# Patient Record
Sex: FEMALE | Race: WHITE
[De-identification: names, ages, dates, MRNs, and addresses within clinical notes are randomized per-mention and may not be internally consistent; named-entity substitution may affect disease eponyms.]

---

## 2017-02-01 ENCOUNTER — TRANSCRIPTION ENCOUNTER (OUTPATIENT)
Age: 47
End: 2017-02-01

## 2017-09-06 PROBLEM — M54.5 LUMBAGO: Status: ACTIVE | Noted: 2017-09-06

## 2017-09-07 ENCOUNTER — APPOINTMENT (OUTPATIENT)
Dept: ORTHOPEDIC SURGERY | Facility: CLINIC | Age: 47
End: 2017-09-07
Payer: COMMERCIAL

## 2017-09-07 VITALS
DIASTOLIC BLOOD PRESSURE: 88 MMHG | HEIGHT: 65 IN | WEIGHT: 167 LBS | HEART RATE: 71 BPM | BODY MASS INDEX: 27.82 KG/M2 | SYSTOLIC BLOOD PRESSURE: 134 MMHG

## 2017-09-07 DIAGNOSIS — Z78.9 OTHER SPECIFIED HEALTH STATUS: ICD-10-CM

## 2017-09-07 DIAGNOSIS — M54.16 RADICULOPATHY, LUMBAR REGION: ICD-10-CM

## 2017-09-07 DIAGNOSIS — M54.5 LOW BACK PAIN: ICD-10-CM

## 2017-09-07 DIAGNOSIS — Z86.39 PERSONAL HISTORY OF OTHER ENDOCRINE, NUTRITIONAL AND METABOLIC DISEASE: ICD-10-CM

## 2017-09-07 PROCEDURE — 99204 OFFICE O/P NEW MOD 45 MIN: CPT

## 2017-09-07 PROCEDURE — 72100 X-RAY EXAM L-S SPINE 2/3 VWS: CPT

## 2017-09-07 RX ORDER — MELOXICAM 15 MG/1
15 TABLET ORAL
Qty: 30 | Refills: 0 | Status: ACTIVE | COMMUNITY
Start: 2017-09-07 | End: 1900-01-01

## 2017-09-07 RX ORDER — LEVOTHYROXINE SODIUM 137 UG/1
TABLET ORAL
Refills: 0 | Status: ACTIVE | COMMUNITY

## 2017-09-07 RX ORDER — PNV NO.95/FERROUS FUM/FOLIC AC 28MG-0.8MG
TABLET ORAL
Refills: 0 | Status: ACTIVE | COMMUNITY

## 2017-09-09 ENCOUNTER — EMERGENCY (EMERGENCY)
Facility: HOSPITAL | Age: 47
LOS: 1 days | Discharge: HOME | End: 2017-09-09
Attending: EMERGENCY MEDICINE | Admitting: EMERGENCY MEDICINE
Payer: COMMERCIAL

## 2017-09-09 VITALS
RESPIRATION RATE: 20 BRPM | SYSTOLIC BLOOD PRESSURE: 119 MMHG | WEIGHT: 164.91 LBS | HEIGHT: 65 IN | HEART RATE: 78 BPM | OXYGEN SATURATION: 98 % | TEMPERATURE: 98 F | DIASTOLIC BLOOD PRESSURE: 82 MMHG

## 2017-09-09 LAB
ALBUMIN SERPL ELPH-MCNC: 4.7 G/DL — SIGNIFICANT CHANGE UP (ref 3.3–5.2)
ALP SERPL-CCNC: 58 U/L — SIGNIFICANT CHANGE UP (ref 40–120)
ALT FLD-CCNC: 13 U/L — SIGNIFICANT CHANGE UP
ANION GAP SERPL CALC-SCNC: 14 MMOL/L — SIGNIFICANT CHANGE UP (ref 5–17)
APPEARANCE UR: CLEAR — SIGNIFICANT CHANGE UP
AST SERPL-CCNC: 22 U/L — SIGNIFICANT CHANGE UP
BILIRUB SERPL-MCNC: 0.4 MG/DL — SIGNIFICANT CHANGE UP (ref 0.4–2)
BILIRUB UR-MCNC: NEGATIVE — SIGNIFICANT CHANGE UP
BUN SERPL-MCNC: 13 MG/DL — SIGNIFICANT CHANGE UP (ref 8–20)
CALCIUM SERPL-MCNC: 9.4 MG/DL — SIGNIFICANT CHANGE UP (ref 8.6–10.2)
CHLORIDE SERPL-SCNC: 100 MMOL/L — SIGNIFICANT CHANGE UP (ref 98–107)
CO2 SERPL-SCNC: 24 MMOL/L — SIGNIFICANT CHANGE UP (ref 22–29)
COLOR SPEC: YELLOW — SIGNIFICANT CHANGE UP
CREAT SERPL-MCNC: 0.69 MG/DL — SIGNIFICANT CHANGE UP (ref 0.5–1.3)
DIFF PNL FLD: NEGATIVE — SIGNIFICANT CHANGE UP
GLUCOSE SERPL-MCNC: 108 MG/DL — SIGNIFICANT CHANGE UP (ref 70–115)
GLUCOSE UR QL: NEGATIVE MG/DL — SIGNIFICANT CHANGE UP
HCG UR QL: NEGATIVE — SIGNIFICANT CHANGE UP
HCT VFR BLD CALC: 34.5 % — LOW (ref 37–47)
HGB BLD-MCNC: 11.2 G/DL — LOW (ref 12–16)
KETONES UR-MCNC: NEGATIVE — SIGNIFICANT CHANGE UP
LEUKOCYTE ESTERASE UR-ACNC: NEGATIVE — SIGNIFICANT CHANGE UP
LIDOCAIN IGE QN: 31 U/L — SIGNIFICANT CHANGE UP (ref 22–51)
MCHC RBC-ENTMCNC: 28.4 PG — SIGNIFICANT CHANGE UP (ref 27–31)
MCHC RBC-ENTMCNC: 32.5 G/DL — SIGNIFICANT CHANGE UP (ref 32–36)
MCV RBC AUTO: 87.6 FL — SIGNIFICANT CHANGE UP (ref 81–99)
NITRITE UR-MCNC: NEGATIVE — SIGNIFICANT CHANGE UP
PH UR: 5 — SIGNIFICANT CHANGE UP (ref 5–8)
PLATELET # BLD AUTO: 261 K/UL — SIGNIFICANT CHANGE UP (ref 150–400)
POTASSIUM SERPL-MCNC: 4.4 MMOL/L — SIGNIFICANT CHANGE UP (ref 3.5–5.3)
POTASSIUM SERPL-SCNC: 4.4 MMOL/L — SIGNIFICANT CHANGE UP (ref 3.5–5.3)
PROT SERPL-MCNC: 8.3 G/DL — SIGNIFICANT CHANGE UP (ref 6.6–8.7)
PROT UR-MCNC: NEGATIVE MG/DL — SIGNIFICANT CHANGE UP
RBC # BLD: 3.94 M/UL — LOW (ref 4.4–5.2)
RBC # FLD: 14.4 % — SIGNIFICANT CHANGE UP (ref 11–15.6)
RBC CASTS # UR COMP ASSIST: NEGATIVE /HPF — SIGNIFICANT CHANGE UP (ref 0–4)
SODIUM SERPL-SCNC: 138 MMOL/L — SIGNIFICANT CHANGE UP (ref 135–145)
SP GR SPEC: 1.01 — SIGNIFICANT CHANGE UP (ref 1.01–1.02)
T4 AB SER-ACNC: 8.5 UG/DL — SIGNIFICANT CHANGE UP (ref 4.5–12)
TSH SERPL-MCNC: 0.58 UIU/ML — SIGNIFICANT CHANGE UP (ref 0.27–4.2)
UROBILINOGEN FLD QL: NEGATIVE MG/DL — SIGNIFICANT CHANGE UP
WBC # BLD: 10.3 K/UL — SIGNIFICANT CHANGE UP (ref 4.8–10.8)
WBC # FLD AUTO: 10.3 K/UL — SIGNIFICANT CHANGE UP (ref 4.8–10.8)
WBC UR QL: SIGNIFICANT CHANGE UP

## 2017-09-09 PROCEDURE — 84443 ASSAY THYROID STIM HORMONE: CPT

## 2017-09-09 PROCEDURE — 74020: CPT | Mod: 26

## 2017-09-09 PROCEDURE — 74020: CPT

## 2017-09-09 PROCEDURE — 80053 COMPREHEN METABOLIC PANEL: CPT

## 2017-09-09 PROCEDURE — 99284 EMERGENCY DEPT VISIT MOD MDM: CPT | Mod: 25

## 2017-09-09 PROCEDURE — 99284 EMERGENCY DEPT VISIT MOD MDM: CPT

## 2017-09-09 PROCEDURE — 83690 ASSAY OF LIPASE: CPT

## 2017-09-09 PROCEDURE — 96374 THER/PROPH/DIAG INJ IV PUSH: CPT

## 2017-09-09 PROCEDURE — 36415 COLL VENOUS BLD VENIPUNCTURE: CPT

## 2017-09-09 PROCEDURE — 96375 TX/PRO/DX INJ NEW DRUG ADDON: CPT

## 2017-09-09 PROCEDURE — 85027 COMPLETE CBC AUTOMATED: CPT

## 2017-09-09 PROCEDURE — 84436 ASSAY OF TOTAL THYROXINE: CPT

## 2017-09-09 PROCEDURE — 81001 URINALYSIS AUTO W/SCOPE: CPT

## 2017-09-09 PROCEDURE — 81025 URINE PREGNANCY TEST: CPT

## 2017-09-09 RX ORDER — SODIUM CHLORIDE 9 MG/ML
1000 INJECTION INTRAMUSCULAR; INTRAVENOUS; SUBCUTANEOUS
Qty: 0 | Refills: 0 | Status: DISCONTINUED | OUTPATIENT
Start: 2017-09-09 | End: 2017-09-13

## 2017-09-09 RX ORDER — MAGNESIUM HYDROXIDE 400 MG/1
30 TABLET, CHEWABLE ORAL ONCE
Qty: 0 | Refills: 0 | Status: DISCONTINUED | OUTPATIENT
Start: 2017-09-09 | End: 2017-09-13

## 2017-09-09 RX ORDER — METOCLOPRAMIDE HCL 10 MG
10 TABLET ORAL ONCE
Qty: 0 | Refills: 0 | Status: COMPLETED | OUTPATIENT
Start: 2017-09-09 | End: 2017-09-09

## 2017-09-09 RX ORDER — KETOROLAC TROMETHAMINE 30 MG/ML
30 SYRINGE (ML) INJECTION ONCE
Qty: 0 | Refills: 0 | Status: DISCONTINUED | OUTPATIENT
Start: 2017-09-09 | End: 2017-09-09

## 2017-09-09 RX ADMIN — Medication 30 MILLIGRAM(S): at 14:16

## 2017-09-09 RX ADMIN — Medication 10 MILLIGRAM(S): at 14:17

## 2017-09-09 RX ADMIN — SODIUM CHLORIDE 150 MILLILITER(S): 9 INJECTION INTRAMUSCULAR; INTRAVENOUS; SUBCUTANEOUS at 14:18

## 2017-09-09 NOTE — ED STATDOCS - ATTENDING CONTRIBUTION TO CARE
I, Binh Lima, performed the initial face to face bedside interview with this patient regarding history of present illness, review of symptoms and relevant past medical, social and family history.  I completed an independent physical examination.  I was the initial provider who evaluated this patient. I have signed out the follow up of any pending tests (i.e. labs, radiological studies) to the ACP.  I have communicated the patient’s plan of care and disposition with the ACP.

## 2017-09-09 NOTE — ED STATDOCS - MEDICAL DECISION MAKING DETAILS
Acute onset of abdominal pain, sharp, continuous, no relief with Simethicone, similar episodes in past, evaluated by GI recommended colonoscopy, notes hx of constipation in past, line, labs, with lipase for pancreatitis, TFT for hypothyroidism, UA for pregnancy/UTI, and AXR for increase stool/ air fluid levels, treat symptomatically, reassess, consider additional studies pending reassessment.

## 2017-09-09 NOTE — ED STATDOCS - OBJECTIVE STATEMENT
48 y/o female with PMHx hypothyroidism and PSHx  x3 presents to the ED with c/o sudden, sharp abdominal pain that waxes and wanes, onset 1 hour ago. Pt states she has had similar symptoms in the past, saw GI, has not had colonoscopy. Attempted to alleviate pain in the past with Simethicone. Pt reports constipation when working 3 days straight, but denies dysuria, fever, chills, and any other acute symptoms and complaints at this time. Pt currently on Synthroid.   LMP: 20 days ago

## 2017-09-09 NOTE — ED STATDOCS - PROGRESS NOTE DETAILS
patient re-evaluated c/o constipation x 3 days, PE: abd soft NT, ND, no masses or hernias, labs show anemia, and UA reviewed, AXR reviewed with radiologist Edson, no acute findings, +constipation noted, will RX MOM and enema to f/u with PMD

## 2017-10-18 ENCOUNTER — APPOINTMENT (OUTPATIENT)
Dept: ORTHOPEDIC SURGERY | Facility: CLINIC | Age: 47
End: 2017-10-18

## 2017-12-28 ENCOUNTER — OTHER (OUTPATIENT)
Age: 47
End: 2017-12-28

## 2018-08-16 PROBLEM — E03.9 HYPOTHYROIDISM, UNSPECIFIED: Chronic | Status: ACTIVE | Noted: 2017-09-11

## 2018-09-18 ENCOUNTER — APPOINTMENT (OUTPATIENT)
Dept: ORTHOPEDIC SURGERY | Facility: CLINIC | Age: 48
End: 2018-09-18
Payer: COMMERCIAL

## 2018-09-18 VITALS
BODY MASS INDEX: 27.82 KG/M2 | DIASTOLIC BLOOD PRESSURE: 84 MMHG | SYSTOLIC BLOOD PRESSURE: 126 MMHG | TEMPERATURE: 97.5 F | HEIGHT: 65 IN | HEART RATE: 74 BPM | WEIGHT: 167 LBS

## 2018-09-18 DIAGNOSIS — R22.31 LOCALIZED SWELLING, MASS AND LUMP, RIGHT UPPER LIMB: ICD-10-CM

## 2018-09-18 PROCEDURE — 73130 X-RAY EXAM OF HAND: CPT | Mod: RT

## 2018-09-18 PROCEDURE — 99213 OFFICE O/P EST LOW 20 MIN: CPT

## 2019-02-10 ENCOUNTER — RESULT REVIEW (OUTPATIENT)
Age: 49
End: 2019-02-10

## 2019-02-11 ENCOUNTER — APPOINTMENT (OUTPATIENT)
Dept: OBGYN | Facility: CLINIC | Age: 49
End: 2019-02-11
Payer: COMMERCIAL

## 2019-02-11 ENCOUNTER — TRANSCRIPTION ENCOUNTER (OUTPATIENT)
Age: 49
End: 2019-02-11

## 2019-02-11 PROCEDURE — 99386 PREV VISIT NEW AGE 40-64: CPT

## 2019-03-06 ENCOUNTER — APPOINTMENT (OUTPATIENT)
Dept: MAMMOGRAPHY | Facility: CLINIC | Age: 49
End: 2019-03-06
Payer: COMMERCIAL

## 2019-03-06 ENCOUNTER — APPOINTMENT (OUTPATIENT)
Dept: ULTRASOUND IMAGING | Facility: CLINIC | Age: 49
End: 2019-03-06
Payer: COMMERCIAL

## 2019-03-06 ENCOUNTER — OUTPATIENT (OUTPATIENT)
Dept: OUTPATIENT SERVICES | Facility: HOSPITAL | Age: 49
LOS: 1 days | End: 2019-03-06
Payer: COMMERCIAL

## 2019-03-06 DIAGNOSIS — R92.8 OTHER ABNORMAL AND INCONCLUSIVE FINDINGS ON DIAGNOSTIC IMAGING OF BREAST: ICD-10-CM

## 2019-03-06 PROCEDURE — 77067 SCR MAMMO BI INCL CAD: CPT | Mod: 26

## 2019-03-06 PROCEDURE — 77063 BREAST TOMOSYNTHESIS BI: CPT

## 2019-03-06 PROCEDURE — 77067 SCR MAMMO BI INCL CAD: CPT

## 2019-03-06 PROCEDURE — 76641 ULTRASOUND BREAST COMPLETE: CPT | Mod: 26,50

## 2019-03-06 PROCEDURE — 76641 ULTRASOUND BREAST COMPLETE: CPT

## 2019-03-06 PROCEDURE — 77063 BREAST TOMOSYNTHESIS BI: CPT | Mod: 26

## 2019-10-15 ENCOUNTER — APPOINTMENT (OUTPATIENT)
Dept: ULTRASOUND IMAGING | Facility: CLINIC | Age: 49
End: 2019-10-15
Payer: COMMERCIAL

## 2019-10-15 ENCOUNTER — OUTPATIENT (OUTPATIENT)
Dept: OUTPATIENT SERVICES | Facility: HOSPITAL | Age: 49
LOS: 1 days | End: 2019-10-15
Payer: COMMERCIAL

## 2019-10-15 DIAGNOSIS — N64.89 OTHER SPECIFIED DISORDERS OF BREAST: ICD-10-CM

## 2019-10-15 PROCEDURE — 76642 ULTRASOUND BREAST LIMITED: CPT | Mod: 26,LT

## 2019-10-15 PROCEDURE — 76642 ULTRASOUND BREAST LIMITED: CPT

## 2020-03-29 ENCOUNTER — APPOINTMENT (OUTPATIENT)
Dept: DISASTER EMERGENCY | Facility: CLINIC | Age: 50
End: 2020-03-29

## 2020-03-29 DIAGNOSIS — R68.89 OTHER GENERAL SYMPTOMS AND SIGNS: ICD-10-CM

## 2020-03-29 DIAGNOSIS — Z20.828 CONTACT WITH AND (SUSPECTED) EXPOSURE TO OTHER VIRAL COMMUNICABLE DISEASES: ICD-10-CM

## 2020-04-02 PROBLEM — R68.89 SUSPECTED 2019 NOVEL CORONAVIRUS INFECTION: Status: ACTIVE | Noted: 2020-03-29

## 2020-04-02 PROBLEM — Z20.828 EXPOSURE TO 2019 NOVEL CORONAVIRUS: Status: ACTIVE | Noted: 2020-03-29

## 2020-04-06 LAB — SARS-COV-2 N GENE NPH QL NAA+PROBE: DETECTED

## 2020-04-25 ENCOUNTER — MESSAGE (OUTPATIENT)
Age: 50
End: 2020-04-25

## 2020-05-01 ENCOUNTER — APPOINTMENT (OUTPATIENT)
Dept: DISASTER EMERGENCY | Facility: HOSPITAL | Age: 50
End: 2020-05-01

## 2020-05-02 LAB
SARS-COV-2 IGG SERPL IA-ACNC: 7 INDEX
SARS-COV-2 IGG SERPL QL IA: POSITIVE

## 2020-05-06 ENCOUNTER — APPOINTMENT (OUTPATIENT)
Dept: VASCULAR SURGERY | Facility: CLINIC | Age: 50
End: 2020-05-06
Payer: COMMERCIAL

## 2020-05-06 PROCEDURE — 93970 EXTREMITY STUDY: CPT

## 2020-05-06 PROCEDURE — 99203 OFFICE O/P NEW LOW 30 MIN: CPT

## 2020-05-06 RX ORDER — ALBUTEROL SULFATE 90 UG/1
108 (90 BASE) INHALANT RESPIRATORY (INHALATION)
Qty: 6 | Refills: 0 | Status: ACTIVE | COMMUNITY
Start: 2020-01-11

## 2020-07-06 ENCOUNTER — TRANSCRIPTION ENCOUNTER (OUTPATIENT)
Age: 50
End: 2020-07-06

## 2020-07-21 NOTE — PROCEDURE
[FreeTextEntry1] : Venous duplex without DVT or SVT. No deep reflux. Has severe reflux on left anterior saphenous vein and tributary varicose veins bilaterally

## 2020-07-21 NOTE — ASSESSMENT
[Arterial/Venous Disease] : arterial/venous disease [FreeTextEntry1] : 51 y/o woman with severe venous stasis and insufficiency, interfering with ability to work and quality of life. Will need superficial reflux treated to prevent ulceration. She understands and agrees. Patient has a component of primary lymphedema. She has been compliant with conservative therapies including compression and elevation for the past month. Despite these therapies symptoms continue to progress. Early signs of hyperpigmentation noted in both calves. I am now recommending a lymphedema pump vended by 10sec. She will require ablation of left ASV and possible sclerotherapy bilaterally to improve symptoms. Will continue compression until procedure.\par \par -Compression/elevation\par Ambulation/exercise\par -Pain control\par -Lymphedema pump\par -RTC for left leg RFA\par

## 2020-07-21 NOTE — PHYSICAL EXAM
[JVD] : no jugular venous distention  [Carotid Bruits] : no carotid bruits [Normal Heart Sounds] : normal heart sounds [Normal Breath Sounds] : Normal breath sounds [Left Carotid Bruit] : no bruit heard over the left carotid [Right Carotid Bruit] : no bruit heard over the right carotid [Left Femoral Bruit] : no bruit heard over the left femoral artery [Right Femoral Bruit] : no bruit heard over the right femoral artery [Ankle Swelling (On Exam)] : present [2+] : right 2+ [Ankle Swelling On The Left] : moderate [Varicose Veins Of Lower Extremities] : bilaterally [No Rash or Lesion] : No rash or lesion [] : bilaterally [Ankle Swelling Bilaterally] : severe [Oriented to Person] : oriented to person [Alert] : alert [Calm] : calm [Oriented to Place] : oriented to place [Oriented to Time] : oriented to time [de-identified] : awake alert [de-identified] : MOM, PERRLA [de-identified] : bilateral edema, hyperpigmentation and bulging veins

## 2020-07-21 NOTE — HISTORY OF PRESENT ILLNESS
[FreeTextEntry1] : 51 y/o woman with severe pain on her lower extremities related to varicose veins. Has had vein stripping in the past and uses panty-hose support stockings custom, and still has worsening symptoms. 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 Has discoloration but no ulcr-eration or cellulitis at this time. Exercises regularly.

## 2020-08-27 ENCOUNTER — RESULT REVIEW (OUTPATIENT)
Age: 50
End: 2020-08-27

## 2020-08-27 ENCOUNTER — APPOINTMENT (OUTPATIENT)
Dept: OBGYN | Facility: CLINIC | Age: 50
End: 2020-08-27
Payer: COMMERCIAL

## 2020-08-27 PROCEDURE — 99396 PREV VISIT EST AGE 40-64: CPT

## 2020-08-27 PROCEDURE — 36415 COLL VENOUS BLD VENIPUNCTURE: CPT

## 2020-09-14 ENCOUNTER — FORM ENCOUNTER (OUTPATIENT)
Age: 50
End: 2020-09-14

## 2020-09-16 ENCOUNTER — APPOINTMENT (OUTPATIENT)
Dept: VASCULAR SURGERY | Facility: CLINIC | Age: 50
End: 2020-09-16

## 2020-09-28 ENCOUNTER — APPOINTMENT (OUTPATIENT)
Dept: VASCULAR SURGERY | Facility: CLINIC | Age: 50
End: 2020-09-28

## 2020-10-02 ENCOUNTER — APPOINTMENT (OUTPATIENT)
Dept: MAMMOGRAPHY | Facility: CLINIC | Age: 50
End: 2020-10-02
Payer: COMMERCIAL

## 2020-10-02 ENCOUNTER — RESULT REVIEW (OUTPATIENT)
Age: 50
End: 2020-10-02

## 2020-10-02 ENCOUNTER — OUTPATIENT (OUTPATIENT)
Dept: OUTPATIENT SERVICES | Facility: HOSPITAL | Age: 50
LOS: 1 days | End: 2020-10-02
Payer: COMMERCIAL

## 2020-10-02 ENCOUNTER — APPOINTMENT (OUTPATIENT)
Dept: ULTRASOUND IMAGING | Facility: CLINIC | Age: 50
End: 2020-10-02
Payer: COMMERCIAL

## 2020-10-02 PROCEDURE — 77067 SCR MAMMO BI INCL CAD: CPT | Mod: 26

## 2020-10-02 PROCEDURE — 76641 ULTRASOUND BREAST COMPLETE: CPT | Mod: 26,50

## 2020-10-02 PROCEDURE — 77063 BREAST TOMOSYNTHESIS BI: CPT | Mod: 26

## 2020-10-02 PROCEDURE — 77067 SCR MAMMO BI INCL CAD: CPT

## 2020-10-02 PROCEDURE — 76641 ULTRASOUND BREAST COMPLETE: CPT

## 2020-10-02 PROCEDURE — 77063 BREAST TOMOSYNTHESIS BI: CPT

## 2021-03-03 ENCOUNTER — APPOINTMENT (OUTPATIENT)
Dept: VASCULAR SURGERY | Facility: CLINIC | Age: 51
End: 2021-03-03
Payer: COMMERCIAL

## 2021-03-03 VITALS
HEART RATE: 75 BPM | BODY MASS INDEX: 27.99 KG/M2 | WEIGHT: 168 LBS | HEIGHT: 65 IN | SYSTOLIC BLOOD PRESSURE: 116 MMHG | OXYGEN SATURATION: 97 % | DIASTOLIC BLOOD PRESSURE: 75 MMHG

## 2021-03-03 PROCEDURE — 99072 ADDL SUPL MATRL&STAF TM PHE: CPT

## 2021-03-03 PROCEDURE — 99213 OFFICE O/P EST LOW 20 MIN: CPT

## 2021-03-03 PROCEDURE — 93970 EXTREMITY STUDY: CPT

## 2021-03-03 RX ORDER — AZITHROMYCIN 250 MG/1
250 TABLET, FILM COATED ORAL
Qty: 6 | Refills: 0 | Status: COMPLETED | COMMUNITY
Start: 2020-01-11 | End: 2021-03-03

## 2021-03-03 RX ORDER — METHYLPREDNISOLONE 4 MG/1
4 TABLET ORAL
Qty: 1 | Refills: 0 | Status: COMPLETED | COMMUNITY
Start: 2017-09-07 | End: 2021-03-03

## 2021-04-23 RX ORDER — ALPRAZOLAM 0.25 MG/1
0.25 TABLET, ORALLY DISINTEGRATING ORAL ONCE
Qty: 1 | Refills: 0 | Status: ACTIVE | COMMUNITY
Start: 2021-04-23 | End: 1900-01-01

## 2021-07-23 ENCOUNTER — APPOINTMENT (OUTPATIENT)
Dept: VASCULAR SURGERY | Facility: CLINIC | Age: 51
End: 2021-07-23
Payer: COMMERCIAL

## 2021-07-23 PROCEDURE — 99072 ADDL SUPL MATRL&STAF TM PHE: CPT

## 2021-07-23 PROCEDURE — 36475 ENDOVENOUS RF 1ST VEIN: CPT

## 2021-07-23 NOTE — PROCEDURE
[FreeTextEntry1] : left saphenous vein RFA [FreeTextEntry2] : venous stasis [FreeTextEntry3] : see attached note

## 2021-07-28 ENCOUNTER — APPOINTMENT (OUTPATIENT)
Dept: VASCULAR SURGERY | Facility: CLINIC | Age: 51
End: 2021-07-28
Payer: COMMERCIAL

## 2021-07-28 PROCEDURE — 93970 EXTREMITY STUDY: CPT

## 2021-08-06 ENCOUNTER — APPOINTMENT (OUTPATIENT)
Dept: VASCULAR SURGERY | Facility: CLINIC | Age: 51
End: 2021-08-06
Payer: COMMERCIAL

## 2021-08-06 VITALS
HEART RATE: 59 BPM | SYSTOLIC BLOOD PRESSURE: 120 MMHG | TEMPERATURE: 97 F | OXYGEN SATURATION: 100 % | DIASTOLIC BLOOD PRESSURE: 84 MMHG

## 2021-08-06 PROCEDURE — 99213 OFFICE O/P EST LOW 20 MIN: CPT

## 2021-09-24 ENCOUNTER — APPOINTMENT (OUTPATIENT)
Dept: VASCULAR SURGERY | Facility: CLINIC | Age: 51
End: 2021-09-24
Payer: COMMERCIAL

## 2021-09-24 VITALS
HEART RATE: 64 BPM | SYSTOLIC BLOOD PRESSURE: 122 MMHG | OXYGEN SATURATION: 100 % | TEMPERATURE: 98 F | DIASTOLIC BLOOD PRESSURE: 81 MMHG

## 2021-09-24 DIAGNOSIS — Z98.890 OTHER SPECIFIED POSTPROCEDURAL STATES: ICD-10-CM

## 2021-09-24 DIAGNOSIS — I87.2 VENOUS INSUFFICIENCY (CHRONIC) (PERIPHERAL): ICD-10-CM

## 2021-09-24 PROCEDURE — 99213 OFFICE O/P EST LOW 20 MIN: CPT

## 2021-10-14 ENCOUNTER — LABORATORY RESULT (OUTPATIENT)
Age: 51
End: 2021-10-14

## 2021-10-14 ENCOUNTER — APPOINTMENT (OUTPATIENT)
Dept: OBGYN | Facility: CLINIC | Age: 51
End: 2021-10-14
Payer: COMMERCIAL

## 2021-10-14 ENCOUNTER — RESULT REVIEW (OUTPATIENT)
Age: 51
End: 2021-10-14

## 2021-10-14 VITALS
SYSTOLIC BLOOD PRESSURE: 115 MMHG | DIASTOLIC BLOOD PRESSURE: 77 MMHG | WEIGHT: 175 LBS | BODY MASS INDEX: 29.16 KG/M2 | HEIGHT: 65 IN

## 2021-10-14 PROCEDURE — 36415 COLL VENOUS BLD VENIPUNCTURE: CPT

## 2021-10-14 PROCEDURE — 99396 PREV VISIT EST AGE 40-64: CPT

## 2021-10-18 DIAGNOSIS — N39.0 URINARY TRACT INFECTION, SITE NOT SPECIFIED: ICD-10-CM

## 2021-10-18 RX ORDER — SULFAMETHOXAZOLE AND TRIMETHOPRIM 800; 160 MG/1; MG/1
800-160 TABLET ORAL TWICE DAILY
Qty: 10 | Refills: 0 | Status: ACTIVE | COMMUNITY
Start: 2021-10-18 | End: 1900-01-01

## 2021-12-08 ENCOUNTER — APPOINTMENT (OUTPATIENT)
Dept: ULTRASOUND IMAGING | Facility: CLINIC | Age: 51
End: 2021-12-08
Payer: COMMERCIAL

## 2021-12-08 ENCOUNTER — RESULT REVIEW (OUTPATIENT)
Age: 51
End: 2021-12-08

## 2021-12-08 ENCOUNTER — OUTPATIENT (OUTPATIENT)
Dept: OUTPATIENT SERVICES | Facility: HOSPITAL | Age: 51
LOS: 1 days | End: 2021-12-08
Payer: COMMERCIAL

## 2021-12-08 DIAGNOSIS — Z00.8 ENCOUNTER FOR OTHER GENERAL EXAMINATION: ICD-10-CM

## 2021-12-08 PROCEDURE — 76830 TRANSVAGINAL US NON-OB: CPT | Mod: 26

## 2021-12-08 PROCEDURE — 76830 TRANSVAGINAL US NON-OB: CPT

## 2021-12-08 PROCEDURE — 76856 US EXAM PELVIC COMPLETE: CPT | Mod: 26

## 2021-12-08 PROCEDURE — 76856 US EXAM PELVIC COMPLETE: CPT

## 2021-12-17 ENCOUNTER — OUTPATIENT (OUTPATIENT)
Dept: OUTPATIENT SERVICES | Facility: HOSPITAL | Age: 51
LOS: 1 days | End: 2021-12-17
Payer: COMMERCIAL

## 2021-12-17 ENCOUNTER — RESULT REVIEW (OUTPATIENT)
Age: 51
End: 2021-12-17

## 2021-12-17 ENCOUNTER — APPOINTMENT (OUTPATIENT)
Dept: ULTRASOUND IMAGING | Facility: CLINIC | Age: 51
End: 2021-12-17
Payer: COMMERCIAL

## 2021-12-17 ENCOUNTER — APPOINTMENT (OUTPATIENT)
Dept: MAMMOGRAPHY | Facility: CLINIC | Age: 51
End: 2021-12-17
Payer: COMMERCIAL

## 2021-12-17 DIAGNOSIS — Z00.8 ENCOUNTER FOR OTHER GENERAL EXAMINATION: ICD-10-CM

## 2021-12-17 PROCEDURE — 77063 BREAST TOMOSYNTHESIS BI: CPT | Mod: 26

## 2021-12-17 PROCEDURE — 76641 ULTRASOUND BREAST COMPLETE: CPT | Mod: 26,50

## 2021-12-17 PROCEDURE — 77067 SCR MAMMO BI INCL CAD: CPT

## 2021-12-17 PROCEDURE — 77067 SCR MAMMO BI INCL CAD: CPT | Mod: 26

## 2021-12-17 PROCEDURE — 76641 ULTRASOUND BREAST COMPLETE: CPT

## 2021-12-17 PROCEDURE — 77063 BREAST TOMOSYNTHESIS BI: CPT

## 2022-01-13 ENCOUNTER — APPOINTMENT (OUTPATIENT)
Dept: OBGYN | Facility: CLINIC | Age: 52
End: 2022-01-13
Payer: COMMERCIAL

## 2022-01-13 VITALS
HEIGHT: 65 IN | BODY MASS INDEX: 28.32 KG/M2 | HEART RATE: 90 BPM | WEIGHT: 170 LBS | RESPIRATION RATE: 15 BRPM | DIASTOLIC BLOOD PRESSURE: 76 MMHG | OXYGEN SATURATION: 98 % | SYSTOLIC BLOOD PRESSURE: 116 MMHG

## 2022-01-13 DIAGNOSIS — Z92.89 PERSONAL HISTORY OF OTHER MEDICAL TREATMENT: ICD-10-CM

## 2022-01-13 DIAGNOSIS — N60.02 SOLITARY CYST OF LEFT BREAST: ICD-10-CM

## 2022-01-13 DIAGNOSIS — Z78.9 OTHER SPECIFIED HEALTH STATUS: ICD-10-CM

## 2022-01-13 DIAGNOSIS — Z98.890 OTHER SPECIFIED POSTPROCEDURAL STATES: ICD-10-CM

## 2022-01-13 DIAGNOSIS — Z82.49 FAMILY HISTORY OF ISCHEMIC HEART DISEASE AND OTHER DISEASES OF THE CIRCULATORY SYSTEM: ICD-10-CM

## 2022-01-13 DIAGNOSIS — E03.9 HYPOTHYROIDISM, UNSPECIFIED: ICD-10-CM

## 2022-01-13 DIAGNOSIS — N39.3 STRESS INCONTINENCE (FEMALE) (MALE): ICD-10-CM

## 2022-01-13 DIAGNOSIS — Z80.41 FAMILY HISTORY OF MALIGNANT NEOPLASM OF OVARY: ICD-10-CM

## 2022-01-13 PROCEDURE — 81025 URINE PREGNANCY TEST: CPT

## 2022-01-13 PROCEDURE — 58100 BIOPSY OF UTERUS LINING: CPT

## 2022-01-13 RX ORDER — OMEGA-3/DHA/EPA/FISH OIL 300-1000MG
CAPSULE ORAL
Refills: 0 | Status: ACTIVE | COMMUNITY

## 2022-01-13 RX ORDER — PNV NO.95/FERROUS FUM/FOLIC AC 28MG-0.8MG
TABLET ORAL
Refills: 0 | Status: ACTIVE | COMMUNITY

## 2022-01-13 NOTE — PROCEDURE
[Endometrial Biopsy] : Endometrial biopsy [Consent Obtained] : Consent obtained [Risks] : risks [Benefits] : benefits [Patient] : patient [Infection] : infection [Bleeding] : bleeding [Allergic Reaction] : allergic reaction [Uterine Perforation] : uterine perforation [Negative] : negative pregnancy test [No Premedication] : No premedication [None] : none [Easy Passage] : Easy passage [Sounded to ___ cm] : sounded to [unfilled] ~Ucm [Scant] : scant [Sent to Pathology] : placed in buffered formalin and sent for pathology [Tolerated Well] : Patient tolerated the procedure well [No Complications] : No complications [LMPDate] : 11/23/2021 [de-identified] : Serena

## 2022-01-28 LAB — CORE LAB BIOPSY: NORMAL

## 2022-02-02 ENCOUNTER — APPOINTMENT (OUTPATIENT)
Dept: OBGYN | Facility: CLINIC | Age: 52
End: 2022-02-02

## 2022-07-19 ENCOUNTER — NON-APPOINTMENT (OUTPATIENT)
Age: 52
End: 2022-07-19

## 2022-08-04 NOTE — ED STATDOCS - CONSTITUTIONAL [-], MLM
Subjective:       Patient ID: Starr Charles is a 17 y.o. female.    Chief Complaint: Cough, Sore Throat, and Nasal Congestion (Cough, sore throat , nasal congestion started today )    Cough, sore throat and nasal congestion    Review of Systems   Constitutional: Negative for appetite change, fatigue and fever.   HENT: Positive for nasal congestion and sore throat. Negative for ear pain.    Eyes: Negative for pain, discharge and itching.   Respiratory: Positive for cough. Negative for shortness of breath.    Cardiovascular: Negative for chest pain and leg swelling.   Gastrointestinal: Negative for abdominal pain, change in bowel habit, nausea, vomiting and change in bowel habit.   Musculoskeletal: Negative for back pain, gait problem and neck pain.   Integumentary:  Negative for rash and wound.   Allergic/Immunologic: Negative for immunocompromised state.   Neurological: Negative for dizziness, weakness and headaches.   All other systems reviewed and are negative.        Objective:      Physical Exam  Vitals and nursing note reviewed.   Constitutional:       General: She is not in acute distress.     Appearance: Normal appearance. She is not ill-appearing, toxic-appearing or diaphoretic.   HENT:      Head: Normocephalic.      Right Ear: Tympanic membrane, ear canal and external ear normal.      Left Ear: Tympanic membrane, ear canal and external ear normal.      Nose: Congestion present. No rhinorrhea.      Mouth/Throat:      Mouth: Mucous membranes are moist.      Pharynx: Posterior oropharyngeal erythema present. No oropharyngeal exudate.   Eyes:      General: No scleral icterus.        Right eye: No discharge.         Left eye: No discharge.      Extraocular Movements: Extraocular movements intact.      Conjunctiva/sclera: Conjunctivae normal.      Pupils: Pupils are equal, round, and reactive to light.   Cardiovascular:      Rate and Rhythm: Normal rate and regular rhythm.      Pulses: Normal pulses.       Heart sounds: Normal heart sounds. No murmur heard.  Pulmonary:      Effort: Pulmonary effort is normal. No respiratory distress.      Breath sounds: Normal breath sounds. No wheezing, rhonchi or rales.   Musculoskeletal:         General: Normal range of motion.      Cervical back: Neck supple. No tenderness.   Lymphadenopathy:      Cervical: No cervical adenopathy.   Skin:     General: Skin is warm and dry.      Capillary Refill: Capillary refill takes less than 2 seconds.      Findings: No rash.   Neurological:      Mental Status: She is alert and oriented to person, place, and time.   Psychiatric:         Mood and Affect: Mood normal.         Behavior: Behavior normal.         Thought Content: Thought content normal.         Judgment: Judgment normal.         Office Visit on 08/04/2022   Component Date Value Ref Range Status    SARS Coronavirus 2 Antigen 08/04/2022 Positive (A) Negative Final     Acceptable 08/04/2022 Yes   Final    Rapid Strep A Screen 08/04/2022 Negative  Negative Final     Acceptable 08/04/2022 Yes   Final      Assessment:       1. COVID-19    2. Encounter for screening laboratory testing for COVID-19 virus    3. Sore throat        Plan:   COVID-19    Encounter for screening laboratory testing for COVID-19 virus  -     SARS Coronavirus 2 Antigen, POCT    Sore throat  -     POCT rapid strep A              no fever/no chills

## 2022-10-21 ENCOUNTER — APPOINTMENT (OUTPATIENT)
Dept: VASCULAR SURGERY | Facility: CLINIC | Age: 52
End: 2022-10-21

## 2022-11-08 ENCOUNTER — APPOINTMENT (OUTPATIENT)
Dept: ENDOCRINOLOGY | Facility: CLINIC | Age: 52
End: 2022-11-08

## 2022-11-11 ENCOUNTER — APPOINTMENT (OUTPATIENT)
Dept: VASCULAR SURGERY | Facility: CLINIC | Age: 52
End: 2022-11-11

## 2022-11-11 VITALS
WEIGHT: 175 LBS | SYSTOLIC BLOOD PRESSURE: 116 MMHG | BODY MASS INDEX: 29.16 KG/M2 | OXYGEN SATURATION: 99 % | HEART RATE: 67 BPM | DIASTOLIC BLOOD PRESSURE: 78 MMHG | HEIGHT: 65 IN

## 2022-11-11 DIAGNOSIS — I83.893 VARICOSE VEINS OF BILATERAL LOWER EXTREMITIES WITH OTHER COMPLICATIONS: ICD-10-CM

## 2022-11-11 PROCEDURE — 36471 NJX SCLRSNT MLT INCMPTNT VN: CPT

## 2022-11-11 NOTE — PROCEDURE
[FreeTextEntry1] : BLE sclerotherapy  [FreeTextEntry2] : painful and itchy reticular and spider veins  [FreeTextEntry3] : Explained sclerotherapy procedure to patient and obtained verbal consent. All questions answered prior to procedure. 70% alcohol used to prep legs prior to procedure. 1% polidocanol injected into BLE reticular veins through a 30 gauge needle. Over 20 injections into legs bilaterally. Sterile gauze and ACE wraps applied to legs bilaterally. Pt tolerated procedure well.\par

## 2023-01-16 ENCOUNTER — NON-APPOINTMENT (OUTPATIENT)
Age: 53
End: 2023-01-16

## 2023-04-04 ENCOUNTER — APPOINTMENT (OUTPATIENT)
Dept: OBGYN | Facility: CLINIC | Age: 53
End: 2023-04-04
Payer: COMMERCIAL

## 2023-04-04 ENCOUNTER — NON-APPOINTMENT (OUTPATIENT)
Age: 53
End: 2023-04-04

## 2023-04-04 VITALS
SYSTOLIC BLOOD PRESSURE: 112 MMHG | DIASTOLIC BLOOD PRESSURE: 68 MMHG | BODY MASS INDEX: 29.66 KG/M2 | HEIGHT: 65 IN | WEIGHT: 178 LBS

## 2023-04-04 DIAGNOSIS — Z87.42 PERSONAL HISTORY OF OTHER DISEASES OF THE FEMALE GENITAL TRACT: ICD-10-CM

## 2023-04-04 DIAGNOSIS — Z87.440 PERSONAL HISTORY OF URINARY (TRACT) INFECTIONS: ICD-10-CM

## 2023-04-04 DIAGNOSIS — Z12.39 ENCOUNTER FOR OTHER SCREENING FOR MALIGNANT NEOPLASM OF BREAST: ICD-10-CM

## 2023-04-04 DIAGNOSIS — N95.1 MENOPAUSAL AND FEMALE CLIMACTERIC STATES: ICD-10-CM

## 2023-04-04 DIAGNOSIS — N89.8 OTHER SPECIFIED NONINFLAMMATORY DISORDERS OF VAGINA: ICD-10-CM

## 2023-04-04 DIAGNOSIS — R68.82 DECREASED LIBIDO: ICD-10-CM

## 2023-04-04 DIAGNOSIS — Z12.31 ENCOUNTER FOR SCREENING MAMMOGRAM FOR MALIGNANT NEOPLASM OF BREAST: ICD-10-CM

## 2023-04-04 DIAGNOSIS — R92.2 INCONCLUSIVE MAMMOGRAM: ICD-10-CM

## 2023-04-04 DIAGNOSIS — N95.2 POSTMENOPAUSAL ATROPHIC VAGINITIS: ICD-10-CM

## 2023-04-04 DIAGNOSIS — R50.9 FEVER, UNSPECIFIED: ICD-10-CM

## 2023-04-04 DIAGNOSIS — Z01.411 ENCOUNTER FOR GYNECOLOGICAL EXAMINATION (GENERAL) (ROUTINE) WITH ABNORMAL FINDINGS: ICD-10-CM

## 2023-04-04 DIAGNOSIS — R10.2 PELVIC AND PERINEAL PAIN: ICD-10-CM

## 2023-04-04 PROCEDURE — 99396 PREV VISIT EST AGE 40-64: CPT

## 2023-04-04 PROCEDURE — 99213 OFFICE O/P EST LOW 20 MIN: CPT | Mod: 25

## 2023-04-04 PROCEDURE — 36415 COLL VENOUS BLD VENIPUNCTURE: CPT

## 2023-04-04 NOTE — HISTORY OF PRESENT ILLNESS
[FreeTextEntry1] : 51yo  postmen fe presents for routine gyn exam.  Pt. reports she has been suffering from  weight gain/moodiness/hot flashes/vaginal dryness/low libido and pain in her joints.  Pt. states she is exercises and eats a healthy diet but continues to put on weight.  Pt. has hx of Hashimoto's.  \par \par \par <<<<<Last exam 2022 for endometrial Biopsy that was benign for PMB\par <<<<<exam 10/2021 AV with [facility] pap wnl\par \par Info. 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 Sexually Active \par PMH\par HYPOTHYROID\par Accepts blood products\par Surgical History: C/Sx3\par Allergies: Latex,\par Current Medications: Prescribed/Suppliments/OTC SYNTHROID 150mcg, MULTIVITAMIN, VITAMIN B12, MAGNESIUM,\par Medications Verified Medications Verified\par Last PAP: 2019 -- Pap normal, Hpv neg Last Mammo: 2014 - MAMMO-NEG Last Colonoscopy: 2019\par Family Hx\par OV Cancer: ? Ovarian age 86\par Personal history of blood clots/DVT/PE: no\par Personal history of conditions causing increased risk of blood clots/DVT/PE: no\par Family history of blood clots/DVT/PE: no\par Family history of conditions causing increased risk of blood clots/DVT/PE: no\par \par Social History\par Patient nonsmoker and has no familial exposure to second hand smoke\par Smoker Status: Never smoker\par \par \par  [Mammogramdate] : 12/2021 [TextBox_19] : wnl [BreastSonogramDate] : 12/2021 [TextBox_25] : wnl [PapSmeardate] : 10/2021 [TextBox_31] : wnl

## 2023-04-04 NOTE — PLAN
[FreeTextEntry1] : HCM\par -SBE\par -pap & HPV today\par -Rx mammo/sono given\par -MVI, Calcium, Vit d\par -Weight/exercise\par -reminded about colonoscopy\par -f/u PCP for recommended HCM, vaccinations and CA screening\par \par Menopausal symptoms\par Discussed the treatment options regarding postmenopausal symptoms focusing on holistic treatments, and non-medicinal treatment. Pt. denies unexplained vaginal bleeding, liver dysfunction/disease, Hx of DVT, pulmonary embolism/clotting disorders, thrombophilia, Stroke, TIA, untreated hypertension, personal Hx of breast/endometrial/estrogen-dependent cancers or heart disease.  \par -Pt. to try  behavior and lifestyle changes x 3 months as well as Bonified products as well as vaginal estrogen.\par -eRx estrace cream\par \par Joint pain\par -referred to Julieth Raymond\par \par Thyroid issues\par -Referred to Linsey Perera\par \par Low Libido\par -discussed strategies as well as FSC/oxytocin products.  Pt. to let me know if she decides to try. \par \par \par

## 2023-04-11 ENCOUNTER — TRANSCRIPTION ENCOUNTER (OUTPATIENT)
Age: 53
End: 2023-04-11

## 2023-04-11 ENCOUNTER — NON-APPOINTMENT (OUTPATIENT)
Age: 53
End: 2023-04-11

## 2023-04-14 LAB
17OHP SERPL-MCNC: 12 NG/DL
25(OH)D3 SERPL-MCNC: 37.7 NG/ML
CHOLEST SERPL-MCNC: 234 MG/DL
CYTOLOGY CVX/VAG DOC THIN PREP: ABNORMAL
DHEA SERPL-MCNC: 231 NG/DL
DHEA-S SERPL-MCNC: 114 UG/DL
ESTIMATED AVERAGE GLUCOSE: 120 MG/DL
ESTRADIOL SERPL-MCNC: 9 PG/ML
FSH SERPL-MCNC: 97.1 IU/L
HBA1C MFR BLD HPLC: 5.8 %
HDLC SERPL-MCNC: 70 MG/DL
HPV HIGH+LOW RISK DNA PNL CVX: NOT DETECTED
LDLC SERPL CALC-MCNC: 138 MG/DL
LH SERPL-ACNC: 58.5 IU/L
NONHDLC SERPL-MCNC: 163 MG/DL
PROLACTIN SERPL-MCNC: 11.1 NG/ML
TESTOST FREE SERPL-MCNC: 0.9 PG/ML
TESTOST SERPL-MCNC: 16 NG/DL
TRIGL SERPL-MCNC: 127 MG/DL
TSH SERPL-ACNC: 0.08 UIU/ML

## 2023-04-17 ENCOUNTER — TRANSCRIPTION ENCOUNTER (OUTPATIENT)
Age: 53
End: 2023-04-17

## 2023-06-12 ENCOUNTER — APPOINTMENT (OUTPATIENT)
Dept: MAMMOGRAPHY | Facility: CLINIC | Age: 53
End: 2023-06-12
Payer: COMMERCIAL

## 2023-06-12 ENCOUNTER — APPOINTMENT (OUTPATIENT)
Dept: ULTRASOUND IMAGING | Facility: CLINIC | Age: 53
End: 2023-06-12
Payer: COMMERCIAL

## 2023-06-12 ENCOUNTER — RESULT REVIEW (OUTPATIENT)
Age: 53
End: 2023-06-12

## 2023-06-12 ENCOUNTER — OUTPATIENT (OUTPATIENT)
Dept: OUTPATIENT SERVICES | Facility: HOSPITAL | Age: 53
LOS: 1 days | End: 2023-06-12
Payer: COMMERCIAL

## 2023-06-12 DIAGNOSIS — R92.2 INCONCLUSIVE MAMMOGRAM: ICD-10-CM

## 2023-06-12 DIAGNOSIS — Z12.31 ENCOUNTER FOR SCREENING MAMMOGRAM FOR MALIGNANT NEOPLASM OF BREAST: ICD-10-CM

## 2023-06-12 DIAGNOSIS — Z00.00 ENCOUNTER FOR GENERAL ADULT MEDICAL EXAMINATION WITHOUT ABNORMAL FINDINGS: ICD-10-CM

## 2023-06-12 PROCEDURE — 77067 SCR MAMMO BI INCL CAD: CPT | Mod: 26

## 2023-06-12 PROCEDURE — 77063 BREAST TOMOSYNTHESIS BI: CPT | Mod: 26

## 2023-06-12 PROCEDURE — 76536 US EXAM OF HEAD AND NECK: CPT | Mod: 26

## 2023-06-12 PROCEDURE — 77067 SCR MAMMO BI INCL CAD: CPT

## 2023-06-12 PROCEDURE — 77063 BREAST TOMOSYNTHESIS BI: CPT

## 2023-06-12 PROCEDURE — 76536 US EXAM OF HEAD AND NECK: CPT

## 2023-07-13 RX ORDER — ESTRADIOL 0.1 MG/G
0.1 CREAM VAGINAL
Qty: 42.5 | Refills: 0 | Status: ACTIVE | COMMUNITY
Start: 2023-04-04 | End: 1900-01-01

## 2023-09-06 ENCOUNTER — NON-APPOINTMENT (OUTPATIENT)
Age: 53
End: 2023-09-06

## 2023-09-07 ENCOUNTER — EMERGENCY (EMERGENCY)
Facility: HOSPITAL | Age: 53
LOS: 1 days | Discharge: HOME | End: 2023-09-07
Attending: EMERGENCY MEDICINE
Payer: COMMERCIAL

## 2023-09-07 VITALS
TEMPERATURE: 98 F | DIASTOLIC BLOOD PRESSURE: 93 MMHG | WEIGHT: 177.91 LBS | SYSTOLIC BLOOD PRESSURE: 133 MMHG | HEART RATE: 81 BPM | OXYGEN SATURATION: 99 % | RESPIRATION RATE: 18 BRPM | HEIGHT: 65 IN

## 2023-09-07 LAB
ALBUMIN SERPL ELPH-MCNC: 4.6 G/DL — SIGNIFICANT CHANGE UP (ref 3.3–5.2)
ALP SERPL-CCNC: 75 U/L — SIGNIFICANT CHANGE UP (ref 40–120)
ALT FLD-CCNC: 15 U/L — SIGNIFICANT CHANGE UP
ANION GAP SERPL CALC-SCNC: 13 MMOL/L — SIGNIFICANT CHANGE UP (ref 5–17)
AST SERPL-CCNC: 22 U/L — SIGNIFICANT CHANGE UP
BASOPHILS # BLD AUTO: 0.04 K/UL — SIGNIFICANT CHANGE UP (ref 0–0.2)
BASOPHILS NFR BLD AUTO: 0.8 % — SIGNIFICANT CHANGE UP (ref 0–2)
BILIRUB SERPL-MCNC: 0.6 MG/DL — SIGNIFICANT CHANGE UP (ref 0.4–2)
BUN SERPL-MCNC: 12.1 MG/DL — SIGNIFICANT CHANGE UP (ref 8–20)
CALCIUM SERPL-MCNC: 9.6 MG/DL — SIGNIFICANT CHANGE UP (ref 8.4–10.5)
CHLORIDE SERPL-SCNC: 102 MMOL/L — SIGNIFICANT CHANGE UP (ref 96–108)
CO2 SERPL-SCNC: 24 MMOL/L — SIGNIFICANT CHANGE UP (ref 22–29)
CREAT SERPL-MCNC: 0.86 MG/DL — SIGNIFICANT CHANGE UP (ref 0.5–1.3)
EGFR: 80 ML/MIN/1.73M2 — SIGNIFICANT CHANGE UP
EOSINOPHIL # BLD AUTO: 0.02 K/UL — SIGNIFICANT CHANGE UP (ref 0–0.5)
EOSINOPHIL NFR BLD AUTO: 0.4 % — SIGNIFICANT CHANGE UP (ref 0–6)
GLUCOSE SERPL-MCNC: 93 MG/DL — SIGNIFICANT CHANGE UP (ref 70–99)
HCT VFR BLD CALC: 40.8 % — SIGNIFICANT CHANGE UP (ref 34.5–45)
HGB BLD-MCNC: 13.8 G/DL — SIGNIFICANT CHANGE UP (ref 11.5–15.5)
IMM GRANULOCYTES NFR BLD AUTO: 1 % — HIGH (ref 0–0.9)
LIDOCAIN IGE QN: 31 U/L — SIGNIFICANT CHANGE UP (ref 22–51)
LYMPHOCYTES # BLD AUTO: 0.88 K/UL — LOW (ref 1–3.3)
LYMPHOCYTES # BLD AUTO: 18.4 % — SIGNIFICANT CHANGE UP (ref 13–44)
MCHC RBC-ENTMCNC: 30.7 PG — SIGNIFICANT CHANGE UP (ref 27–34)
MCHC RBC-ENTMCNC: 33.8 GM/DL — SIGNIFICANT CHANGE UP (ref 32–36)
MCV RBC AUTO: 90.7 FL — SIGNIFICANT CHANGE UP (ref 80–100)
MONOCYTES # BLD AUTO: 0.42 K/UL — SIGNIFICANT CHANGE UP (ref 0–0.9)
MONOCYTES NFR BLD AUTO: 8.8 % — SIGNIFICANT CHANGE UP (ref 2–14)
NEUTROPHILS # BLD AUTO: 3.37 K/UL — SIGNIFICANT CHANGE UP (ref 1.8–7.4)
NEUTROPHILS NFR BLD AUTO: 70.6 % — SIGNIFICANT CHANGE UP (ref 43–77)
PLATELET # BLD AUTO: 256 K/UL — SIGNIFICANT CHANGE UP (ref 150–400)
POTASSIUM SERPL-MCNC: 4.1 MMOL/L — SIGNIFICANT CHANGE UP (ref 3.5–5.3)
POTASSIUM SERPL-SCNC: 4.1 MMOL/L — SIGNIFICANT CHANGE UP (ref 3.5–5.3)
PROT SERPL-MCNC: 7.3 G/DL — SIGNIFICANT CHANGE UP (ref 6.6–8.7)
RBC # BLD: 4.5 M/UL — SIGNIFICANT CHANGE UP (ref 3.8–5.2)
RBC # FLD: 13.1 % — SIGNIFICANT CHANGE UP (ref 10.3–14.5)
SODIUM SERPL-SCNC: 139 MMOL/L — SIGNIFICANT CHANGE UP (ref 135–145)
WBC # BLD: 4.78 K/UL — SIGNIFICANT CHANGE UP (ref 3.8–10.5)
WBC # FLD AUTO: 4.78 K/UL — SIGNIFICANT CHANGE UP (ref 3.8–10.5)

## 2023-09-07 PROCEDURE — 99285 EMERGENCY DEPT VISIT HI MDM: CPT | Mod: 25

## 2023-09-07 PROCEDURE — 99285 EMERGENCY DEPT VISIT HI MDM: CPT

## 2023-09-07 PROCEDURE — 76705 ECHO EXAM OF ABDOMEN: CPT

## 2023-09-07 PROCEDURE — 76705 ECHO EXAM OF ABDOMEN: CPT | Mod: 26

## 2023-09-07 PROCEDURE — 80053 COMPREHEN METABOLIC PANEL: CPT

## 2023-09-07 PROCEDURE — 74177 CT ABD & PELVIS W/CONTRAST: CPT | Mod: 26,MD

## 2023-09-07 PROCEDURE — 74177 CT ABD & PELVIS W/CONTRAST: CPT | Mod: MD

## 2023-09-07 PROCEDURE — 93005 ELECTROCARDIOGRAM TRACING: CPT

## 2023-09-07 PROCEDURE — 83690 ASSAY OF LIPASE: CPT

## 2023-09-07 PROCEDURE — 85025 COMPLETE CBC W/AUTO DIFF WBC: CPT

## 2023-09-07 PROCEDURE — 36415 COLL VENOUS BLD VENIPUNCTURE: CPT

## 2023-09-07 PROCEDURE — 93010 ELECTROCARDIOGRAM REPORT: CPT

## 2023-09-07 RX ORDER — SODIUM CHLORIDE 9 MG/ML
1000 INJECTION INTRAMUSCULAR; INTRAVENOUS; SUBCUTANEOUS ONCE
Refills: 0 | Status: COMPLETED | OUTPATIENT
Start: 2023-09-07 | End: 2023-09-07

## 2023-09-07 RX ADMIN — SODIUM CHLORIDE 1000 MILLILITER(S): 9 INJECTION INTRAMUSCULAR; INTRAVENOUS; SUBCUTANEOUS at 13:23

## 2023-09-07 NOTE — ED STATDOCS - OBJECTIVE STATEMENT
54 y/o female with PMHx of hypothyroidism, c-sectionx3, presents to the ED c/o abdominal pain, fevers, chills, body aches, diarrhea. Pt states last night she had some generalized body aches, sudden onset of diarrhea. Pt then had some subjective fevers and chills throughout the night. Pt went to urgent care and had negative rapid covid. She then had sudden sharp abdominal pain followed by an episode of yellow foul smelling diarrhea. Pt states that abdominal pain is currently resolved. Pt denies recent travel, denies recent antibiotics usage, denies recent abnormal food intake.

## 2023-09-07 NOTE — ED STATDOCS - PATIENT PORTAL LINK FT
You can access the FollowMyHealth Patient Portal offered by [facility] by registering at the following website: [email or web address]. By joining [employer]’s FollowMyHealth portal, you will also be able to view your health information using other applications (apps) compatible with our system.

## 2023-09-07 NOTE — ED STATDOCS - CLINICAL SUMMARY MEDICAL DECISION MAKING FREE TEXT BOX
will check labs, symptomatic treatment, check US to evaluate for possible gallbladder pathology, if RUQ US unrevealing consider CT if pt has persistent symptoms. will check labs, symptomatic treatment, check US to evaluate for possible gallbladder pathology, if RUQ US unrevealing consider CT if pt has persistent symptom aydee Martinez PA-C: PT with stable VS, no acute distress, non toxic appearing, tolerating PO in the ED, Pt with clinical improvement while in the ED. Pt with no acute findigns, Pt cleared for dc home supportive care, follow up to PCP, educated about when to return to the ED if needed. PT verbalizes that he understands all instructions and results. Pt informed that ED is open and available 24/7 365 days a yr, encouraged to return to the ED if they have any change in condition, or feel the need for revaluation.

## 2023-09-07 NOTE — ED ADULT TRIAGE NOTE - CHIEF COMPLAINT QUOTE
body aches, liquid bowel movements with bile. went to urgent care this am. started getting epigastric chest pain 1 hour ago, resolving.

## 2023-09-07 NOTE — ED ADULT NURSE NOTE - OBJECTIVE STATEMENT
Patient is alert and oriented X4 from home. Patient comes into the ER with body aches, liquid bowel movements with bile. Patient states that they went to urgent care this am. While their he started getting epigastric chest pain 1 hour ago. Patient states that the pain has resolved at this time.

## 2023-09-07 NOTE — ED STATDOCS - ATTENDING APP SHARED VISIT CONTRIBUTION OF CARE
This was a shared visit with LESLEY. I reviewed and verified the documentation and independently performed the documented history/exam/mdm.

## 2023-09-07 NOTE — ED STATDOCS - NSFOLLOWUPINSTRUCTIONS_ED_ALL_ED_FT
Patient education: Abdominal pain (The Basics)  Written by the doctors and editors at [facility]  Please read the Disclaimer at the end of this page.    Are there different types of abdominal pain?  Yes. "Abdominal pain" means pain in the abdomen (or belly), which is the part of the body between the chest and the genital area. This pain can happen for different reasons. It can be "chronic," which means it develops over time, or "acute," which means it starts suddenly. It can be mild or severe. A person might feel the pain all over their abdomen, or only in 1 part.    Abdominal pain can feel sharp or crampy, or dull and steady. Some people feel better if they curl into a ball, while others need to lie flat and completely still. People often feel sick to their stomach and retch or throw up.    Sometimes, abdominal pain can be so severe that the person has a hard time moving or breathing. Severe pain can be a medical emergency, and should be seen by a doctor or nurse right away.    What causes abdominal pain?  Lots of different things can cause abdominal pain. Less severe pain can be due to something like a virus or a stomach inflammation (called "gastritis").    Acute pain that is more severe can be caused by problems with 1 or more organs in the abdomen. Organs in the abdomen can be part of the digestive, urinary, or reproductive systems (figure 1 and figure 2 and figure 3).    Conditions that affect organs in the chest or genital area can also cause pain. Even though these organs aren't in the abdomen, people might still have abdominal pain.    Common causes of acute abdominal pain in adults include:    ?Appendicitis – Appendicitis is the term for when the appendix (a long, thin pouch that hangs down from the large intestine) gets infected and inflamed.    ?Diverticulitis – Diverticulitis is an infection that develops in small pouches that can form in the intestine. This is more common in older people.    ?Gallstones – Gallstones are small stones that form inside an organ called the gallbladder, which stores bile, a fluid that helps the body break down fat. Many people have gallstones that do not cause them any problems. But in some cases, gallstones can cause pain.    ?Abscess – An abscess is a collection of pus from an infection. Abscesses can form anywhere in the abdomen, but they typically happen near the intestine.    ?Kidney stones – Kidney stones can form when salts and minerals that are normally in the urine build up and harden. They can cause pain when they pass through the ureters, which are the tubes that carry urine from the kidney to the bladder.    ?Bowel perforation – This is a hole in the bowel wall.    ?Perforated ulcer – This is a hole in the wall of the stomach or intestine.    ?Pancreatitis – This is the term for when the pancreas gets inflamed.    ?Ruptured cyst in the ovary – Cysts in the ovary are fluid-filled sacs. They sometimes break open or "rupture," which is very painful.    ?Ectopic pregnancy – An ectopic pregnancy is a pregnancy that starts outside the uterus. In most ectopic pregnancies, this happens in 1 of the fallopian tubes (the tubes that connect the ovaries to the uterus). This can cause pain and other symptoms, and requires urgent treatment. An embryo cannot safely develop outside the uterus.    Should I see a doctor or nurse?  Yes. If you have sudden or severe abdominal pain, call your doctor or nurse or go to the emergency department right away. Depending on the cause of your pain, you might need immediate treatment.    Will I need tests?  Probably. The doctor or nurse will ask about your symptoms, including where your pain is and what it feels like. The location of the pain can be an important clue to the cause.    Your doctor will ask about your current and past medical conditions, and do a physical exam. They might do repeat exams over time to follow your symptoms.    Your doctor will decide which tests you should have based on your symptoms and individual situation. The tests might include:    ?Blood tests    ?Urine tests    ?X-rays    ?An ultrasound, CT scan, or other imaging test – Imaging tests create pictures of the inside of the body.    How is abdominal pain treated?  Treatment depends on what's causing the pain. It might include 1 or more of the following:    ?Fluids given by IV (a thin tube that goes into a vein)    ?Pain medicines    ?Antibiotic medicines to treat an infection    ?Other medicines to treat other medical conditions    ?Surgery

## 2023-09-07 NOTE — ED STATDOCS - ADDITIONAL NOTES AND INSTRUCTIONS:
PT was evaluated At [facility] ED and was found to have a condition that warranted time of to rest and heal from WORK/SCHOOL.   Scot Martinez PA-C

## 2023-09-07 NOTE — ED ADULT NURSE NOTE - NSFALLUNIVINTERV_ED_ALL_ED
Bed/Stretcher in lowest position, wheels locked, appropriate side rails in place/Call bell, personal items and telephone in reach/Instruct patient to call for assistance before getting out of bed/chair/stretcher/Non-slip footwear applied when patient is off stretcher/[place] to call system/Physically safe environment - no spills, clutter or unnecessary equipment/Purposeful proactive rounding/Room/bathroom lighting operational, light cord in reach

## 2023-12-03 ENCOUNTER — NON-APPOINTMENT (OUTPATIENT)
Age: 53
End: 2023-12-03

## 2023-12-21 ENCOUNTER — APPOINTMENT (OUTPATIENT)
Dept: ORTHOPEDIC SURGERY | Facility: CLINIC | Age: 53
End: 2023-12-21
Payer: COMMERCIAL

## 2023-12-21 VITALS — HEIGHT: 65 IN | BODY MASS INDEX: 29.66 KG/M2 | WEIGHT: 178 LBS

## 2023-12-21 DIAGNOSIS — S43.421A SPRAIN OF RIGHT ROTATOR CUFF CAPSULE, INITIAL ENCOUNTER: ICD-10-CM

## 2023-12-21 DIAGNOSIS — M75.41 IMPINGEMENT SYNDROME OF RIGHT SHOULDER: ICD-10-CM

## 2023-12-21 PROCEDURE — 99203 OFFICE O/P NEW LOW 30 MIN: CPT

## 2023-12-21 PROCEDURE — 73010 X-RAY EXAM OF SHOULDER BLADE: CPT | Mod: RT

## 2023-12-21 PROCEDURE — 72040 X-RAY EXAM NECK SPINE 2-3 VW: CPT

## 2023-12-21 PROCEDURE — 73030 X-RAY EXAM OF SHOULDER: CPT | Mod: RT

## 2023-12-21 RX ORDER — MELOXICAM 15 MG/1
15 TABLET ORAL DAILY
Qty: 30 | Refills: 1 | Status: COMPLETED | COMMUNITY
Start: 2023-12-21 | End: 2024-02-19

## 2023-12-21 NOTE — IMAGING
[de-identified] : Right Shoulder No swelling, no ecchymosis, no deformity, no scapular winging. Biceps tenderness to palpation  Forward flexion: Active 160 degrees Abduction: Active 160 degrees External rotation (with shoulder abducted): Active 90 degrees Internal rotation (with shoulder abducted): Active 70 degrees 5/5 supraspinatus, 5/5 infraspinatus and 5/5 subscapularis. There is some pain with strength testing.  Negative Singh test, positive impingement sign, negative Cullen test. Speeds and Yergason negative Motor and sensory intact distally  Cervical Spine No swelling, no ecchymosis No trapezial or paracervical tenderness to palpation Full range of motion with no pain 5/5 deltoid, biceps, triceps and wrist flexors/extensors Negative spurling, negative cervantes Reflexes +2, intact motor and sensory distally [No bony abnormalities] : No bony abnormalities [Right] : right shoulder [There are no fractures, subluxations or dislocations. No significant abnormalities are seen] : There are no fractures, subluxations or dislocations. No significant abnormalities are seen [FreeTextEntry1] : Enchondroma noted.

## 2023-12-21 NOTE — ASSESSMENT
[FreeTextEntry1] : EXACERBATION OF RIGHT SHOULDER PAIN S/P TRIP AND FALL 2 YEARS AGO.  SOME WEAKNESS ON EXAM TODAY. LIKE D/T RCT PATHOLOGY.   ENCHONDROMA NOTED ON SHOULDER XR.  RECOMMEND TO START PT. MELOXICAM RX. DISCUSSED R/B/A.  DISCUSSED AVAILABILITY OF CSI - PATIENT DEFERS.  RTC 6 WEEKS, IF NO IMPROVEMENT CONSIDER MRI.

## 2023-12-21 NOTE — HISTORY OF PRESENT ILLNESS
[de-identified] : 12/21/23: Patient is here for right shoulder injury S/P slipped and fell directly on shoulder. Did not experience immediate pain. Started  Denies subluxation/dislocation. No n/t. Clicking with movement. Worsens with overhead motion/lifting. Weakness. Difficulty with ROM. No prior injury. No formal treatment to date. Tried ibuprofen.

## 2024-02-15 ENCOUNTER — APPOINTMENT (OUTPATIENT)
Dept: ORTHOPEDIC SURGERY | Facility: CLINIC | Age: 54
End: 2024-02-15

## 2024-05-16 ENCOUNTER — APPOINTMENT (OUTPATIENT)
Dept: OBGYN | Facility: CLINIC | Age: 54
End: 2024-05-16

## 2024-07-29 ENCOUNTER — NON-APPOINTMENT (OUTPATIENT)
Age: 54
End: 2024-07-29

## 2024-08-26 ENCOUNTER — APPOINTMENT (OUTPATIENT)
Dept: OBGYN | Facility: CLINIC | Age: 54
End: 2024-08-26
Payer: COMMERCIAL

## 2024-08-26 VITALS
HEIGHT: 65 IN | WEIGHT: 178 LBS | DIASTOLIC BLOOD PRESSURE: 87 MMHG | BODY MASS INDEX: 29.66 KG/M2 | SYSTOLIC BLOOD PRESSURE: 134 MMHG | HEART RATE: 73 BPM

## 2024-08-26 DIAGNOSIS — Z01.419 ENCOUNTER FOR GYNECOLOGICAL EXAMINATION (GENERAL) (ROUTINE) W/OUT ABNORMAL FINDINGS: ICD-10-CM

## 2024-08-26 DIAGNOSIS — R73.03 PREDIABETES.: ICD-10-CM

## 2024-08-26 DIAGNOSIS — Z11.51 ENCOUNTER FOR SCREENING FOR HUMAN PAPILLOMAVIRUS (HPV): ICD-10-CM

## 2024-08-26 PROCEDURE — 99386 PREV VISIT NEW AGE 40-64: CPT

## 2024-08-26 NOTE — REVIEW OF SYSTEMS
Health Maintenance Summary     Topic Due On Due Status Completed On Postpone Until Reason    MAMMOGRAM - BREAST CANCER SCREENING Mar 15, 2018 Not Due Mar 15, 2016      Colorectal Cancer Screening - Colonoscopy Apr 28, 2020 Not Due Apr 28, 2015      Immunization - Td/Tdap Jun 22, 2026 Not Due Jun 22, 2016      Immunization-Zoster May 17, 2016 Postponed  Feb 21, 2017 Patient Refused    Immunization - TDAP Pregnancy  Hidden       Immunization-Influenza  Completed Sep 6, 2016            Patient is due for topics as listed above, she wishes to decline at this time .       [Negative] : Heme/Lymph

## 2024-08-27 ENCOUNTER — TRANSCRIPTION ENCOUNTER (OUTPATIENT)
Age: 54
End: 2024-08-27

## 2024-08-27 LAB
ALBUMIN SERPL ELPH-MCNC: 4.7 G/DL
ALP BLD-CCNC: 79 U/L
ALT SERPL-CCNC: 20 U/L
ANION GAP SERPL CALC-SCNC: 17 MMOL/L
AST SERPL-CCNC: 23 U/L
BASOPHILS # BLD AUTO: 0.04 K/UL
BASOPHILS NFR BLD AUTO: 0.5 %
BILIRUB SERPL-MCNC: 0.2 MG/DL
BUN SERPL-MCNC: 18 MG/DL
CALCIUM SERPL-MCNC: 9.8 MG/DL
CHLORIDE SERPL-SCNC: 103 MMOL/L
CHOLEST SERPL-MCNC: 251 MG/DL
CO2 SERPL-SCNC: 25 MMOL/L
CREAT SERPL-MCNC: 1.06 MG/DL
EGFR: 62 ML/MIN/1.73M2
EOSINOPHIL # BLD AUTO: 0.22 K/UL
EOSINOPHIL NFR BLD AUTO: 3 %
ESTIMATED AVERAGE GLUCOSE: 117 MG/DL
GLUCOSE SERPL-MCNC: 67 MG/DL
HBA1C MFR BLD HPLC: 5.7 %
HCT VFR BLD CALC: 38.3 %
HDLC SERPL-MCNC: 62 MG/DL
HGB BLD-MCNC: 12.6 G/DL
HPV HIGH+LOW RISK DNA PNL CVX: NOT DETECTED
IMM GRANULOCYTES NFR BLD AUTO: 0.1 %
LDLC SERPL CALC-MCNC: 129 MG/DL
LYMPHOCYTES # BLD AUTO: 2.82 K/UL
LYMPHOCYTES NFR BLD AUTO: 38.4 %
MAN DIFF?: NORMAL
MCHC RBC-ENTMCNC: 30.3 PG
MCHC RBC-ENTMCNC: 32.9 GM/DL
MCV RBC AUTO: 92.1 FL
MONOCYTES # BLD AUTO: 0.48 K/UL
MONOCYTES NFR BLD AUTO: 6.5 %
NEUTROPHILS # BLD AUTO: 3.77 K/UL
NEUTROPHILS NFR BLD AUTO: 51.5 %
NONHDLC SERPL-MCNC: 189 MG/DL
PLATELET # BLD AUTO: 284 K/UL
POTASSIUM SERPL-SCNC: 4.1 MMOL/L
PROT SERPL-MCNC: 7.4 G/DL
RBC # BLD: 4.16 M/UL
RBC # FLD: 13.3 %
SODIUM SERPL-SCNC: 144 MMOL/L
TRIGL SERPL-MCNC: 337 MG/DL
WBC # FLD AUTO: 7.34 K/UL

## 2024-08-27 NOTE — PLAN
[FreeTextEntry1] : 53 yo for annual visit.   HCM -pap smear today -rx mammo and sono -colonoscopy advised -Lipid profile done today -rx pelvic sono -referred to ANGELIKA Zavaleta regarding menopausal symptoms -rto 1 year

## 2024-08-27 NOTE — PHYSICAL EXAM
[Chaperone Present] : A chaperone was present in the examining room during all aspects of the physical examination [Appropriately responsive] : appropriately responsive [Alert] : alert [No Acute Distress] : no acute distress [No Lymphadenopathy] : no lymphadenopathy [Soft] : soft [Non-tender] : non-tender [Non-distended] : non-distended [No HSM] : No HSM [No Lesions] : no lesions [No Mass] : no mass [Oriented x3] : oriented x3 [Examination Of The Breasts] : a normal appearance [No Masses] : no breast masses were palpable [Labia Majora] : normal [Labia Minora] : normal [Normal] : normal [Uterine Adnexae] : normal [FreeTextEntry2] : Guillermo

## 2024-08-27 NOTE — END OF VISIT
[FreeTextEntry3] : I Ede Ingram, acted as a scribe on behalf of Dr. Moy Centeno on 08/26/2024.  All medical entries made by this scribe where at my Dr. Moy Centeno, direction and personally dictated by me on 08/26/2024.  I have reviewed the chart and agree that the record accurately reflects my personal performance of the history, physical exam, assessment, and plan. I have also personally directed, reviewed and agreed with the chart.

## 2024-08-30 ENCOUNTER — TRANSCRIPTION ENCOUNTER (OUTPATIENT)
Age: 54
End: 2024-08-30

## 2024-08-31 LAB — CYTOLOGY CVX/VAG DOC THIN PREP: ABNORMAL

## 2024-11-18 ENCOUNTER — OUTPATIENT (OUTPATIENT)
Dept: OUTPATIENT SERVICES | Facility: HOSPITAL | Age: 54
LOS: 1 days | End: 2024-11-18
Payer: COMMERCIAL

## 2024-11-18 ENCOUNTER — RESULT REVIEW (OUTPATIENT)
Age: 54
End: 2024-11-18

## 2024-11-18 ENCOUNTER — APPOINTMENT (OUTPATIENT)
Dept: ULTRASOUND IMAGING | Facility: CLINIC | Age: 54
End: 2024-11-18
Payer: COMMERCIAL

## 2024-11-18 DIAGNOSIS — Z00.00 ENCOUNTER FOR GENERAL ADULT MEDICAL EXAMINATION WITHOUT ABNORMAL FINDINGS: ICD-10-CM

## 2024-11-18 PROCEDURE — 76856 US EXAM PELVIC COMPLETE: CPT | Mod: 26

## 2024-11-18 PROCEDURE — 76856 US EXAM PELVIC COMPLETE: CPT

## 2024-11-18 PROCEDURE — 76830 TRANSVAGINAL US NON-OB: CPT

## 2024-11-18 PROCEDURE — 76830 TRANSVAGINAL US NON-OB: CPT | Mod: 26

## 2024-12-16 ENCOUNTER — RESULT REVIEW (OUTPATIENT)
Age: 54
End: 2024-12-16

## 2024-12-16 ENCOUNTER — OUTPATIENT (OUTPATIENT)
Dept: OUTPATIENT SERVICES | Facility: HOSPITAL | Age: 54
LOS: 1 days | End: 2024-12-16
Payer: COMMERCIAL

## 2024-12-16 ENCOUNTER — APPOINTMENT (OUTPATIENT)
Dept: ULTRASOUND IMAGING | Facility: CLINIC | Age: 54
End: 2024-12-16
Payer: COMMERCIAL

## 2024-12-16 ENCOUNTER — APPOINTMENT (OUTPATIENT)
Dept: MAMMOGRAPHY | Facility: CLINIC | Age: 54
End: 2024-12-16
Payer: COMMERCIAL

## 2024-12-16 DIAGNOSIS — Z00.8 ENCOUNTER FOR OTHER GENERAL EXAMINATION: ICD-10-CM

## 2024-12-16 PROCEDURE — G0279: CPT | Mod: 26

## 2024-12-16 PROCEDURE — 76641 ULTRASOUND BREAST COMPLETE: CPT

## 2024-12-16 PROCEDURE — 76641 ULTRASOUND BREAST COMPLETE: CPT | Mod: 26,50

## 2024-12-16 PROCEDURE — 77066 DX MAMMO INCL CAD BI: CPT | Mod: 26

## 2024-12-16 PROCEDURE — G0279: CPT

## 2024-12-16 PROCEDURE — 77066 DX MAMMO INCL CAD BI: CPT

## 2025-02-27 ENCOUNTER — APPOINTMENT (OUTPATIENT)
Dept: OBGYN | Facility: CLINIC | Age: 55
End: 2025-02-27
Payer: COMMERCIAL

## 2025-02-27 ENCOUNTER — ASOB RESULT (OUTPATIENT)
Age: 55
End: 2025-02-27

## 2025-02-27 VITALS
HEIGHT: 65 IN | SYSTOLIC BLOOD PRESSURE: 135 MMHG | DIASTOLIC BLOOD PRESSURE: 81 MMHG | WEIGHT: 179 LBS | BODY MASS INDEX: 29.82 KG/M2

## 2025-02-27 PROCEDURE — 99213 OFFICE O/P EST LOW 20 MIN: CPT

## 2025-02-27 PROCEDURE — 76830 TRANSVAGINAL US NON-OB: CPT

## 2025-03-01 RX ORDER — PROGESTERONE 100 MG/1
100 CAPSULE ORAL
Qty: 90 | Refills: 0 | Status: ACTIVE | COMMUNITY
Start: 2025-02-27 | End: 1900-01-01

## 2025-03-01 RX ORDER — ESTRADIOL 0.05 MG/D
0.05 PATCH, EXTENDED RELEASE TRANSDERMAL
Qty: 24 | Refills: 1 | Status: ACTIVE | COMMUNITY
Start: 2025-02-27 | End: 1900-01-01

## 2025-03-02 ENCOUNTER — NON-APPOINTMENT (OUTPATIENT)
Age: 55
End: 2025-03-02

## 2025-04-30 ENCOUNTER — TRANSCRIPTION ENCOUNTER (OUTPATIENT)
Age: 55
End: 2025-04-30

## 2025-06-09 ENCOUNTER — TRANSCRIPTION ENCOUNTER (OUTPATIENT)
Age: 55
End: 2025-06-09

## 2025-06-19 ENCOUNTER — NON-APPOINTMENT (OUTPATIENT)
Age: 55
End: 2025-06-19

## 2025-06-25 ENCOUNTER — NON-APPOINTMENT (OUTPATIENT)
Age: 55
End: 2025-06-25

## 2025-07-01 ENCOUNTER — OFFICE (OUTPATIENT)
Dept: URBAN - METROPOLITAN AREA CLINIC 6 | Facility: CLINIC | Age: 55
Setting detail: OPHTHALMOLOGY
End: 2025-07-01
Payer: COMMERCIAL

## 2025-07-01 DIAGNOSIS — H43.813: ICD-10-CM

## 2025-07-01 DIAGNOSIS — H01.001: ICD-10-CM

## 2025-07-01 DIAGNOSIS — H01.004: ICD-10-CM

## 2025-07-01 PROCEDURE — 92004 COMPRE OPH EXAM NEW PT 1/>: CPT | Performed by: OPHTHALMOLOGY

## 2025-07-01 ASSESSMENT — KERATOMETRY
OS_K2POWER_DIOPTERS: 42.25
OD_K1POWER_DIOPTERS: 41.75
OD_K2POWER_DIOPTERS: 42.00
OS_AXISANGLE_DEGREES: 176
OS_K1POWER_DIOPTERS: 42.00
OD_AXISANGLE_DEGREES: 027

## 2025-07-01 ASSESSMENT — CONFRONTATIONAL VISUAL FIELD TEST (CVF)
OS_FINDINGS: FULL
OD_FINDINGS: FULL

## 2025-07-01 ASSESSMENT — REFRACTION_CURRENTRX
OS_SPHERE: +2.00
OD_ADD: +1.75
OD_SPHERE: +1.75
OD_CYLINDER: SPHERE
OS_ADD: +1.75
OS_AXIS: 087
OS_CYLINDER: -0.50
OD_VPRISM_DIRECTION: PROGS
OS_VPRISM_DIRECTION: PROGS
OD_OVR_VA: 20/
OS_OVR_VA: 20/

## 2025-07-01 ASSESSMENT — TONOMETRY
OD_IOP_MMHG: 16
OS_IOP_MMHG: 14

## 2025-07-01 ASSESSMENT — REFRACTION_AUTOREFRACTION
OD_SPHERE: +2.50
OS_AXIS: 095
OS_CYLINDER: -1.50
OD_CYLINDER: -1.25
OS_SPHERE: +3.00
OD_AXIS: 088

## 2025-07-01 ASSESSMENT — LID EXAM ASSESSMENTS
OD_BLEPHARITIS: RUL T
OS_BLEPHARITIS: LUL T

## 2025-07-01 ASSESSMENT — VISUAL ACUITY
OS_BCVA: 20/20-2
OD_BCVA: 20/20-1

## 2025-07-24 ENCOUNTER — ASOB RESULT (OUTPATIENT)
Age: 55
End: 2025-07-24

## 2025-07-24 ENCOUNTER — APPOINTMENT (OUTPATIENT)
Dept: OBGYN | Facility: CLINIC | Age: 55
End: 2025-07-24
Payer: COMMERCIAL

## 2025-07-24 VITALS
DIASTOLIC BLOOD PRESSURE: 85 MMHG | WEIGHT: 180 LBS | BODY MASS INDEX: 29.99 KG/M2 | HEIGHT: 65 IN | SYSTOLIC BLOOD PRESSURE: 130 MMHG

## 2025-07-24 DIAGNOSIS — N95.0 POSTMENOPAUSAL BLEEDING: ICD-10-CM

## 2025-07-24 PROCEDURE — 58100 BIOPSY OF UTERUS LINING: CPT

## 2025-07-24 PROCEDURE — 76830 TRANSVAGINAL US NON-OB: CPT

## 2025-07-29 LAB — CORE LAB BIOPSY: NORMAL

## 2025-07-30 ENCOUNTER — OFFICE (OUTPATIENT)
Dept: URBAN - METROPOLITAN AREA CLINIC 6 | Facility: CLINIC | Age: 55
Setting detail: OPHTHALMOLOGY
End: 2025-07-30
Payer: COMMERCIAL

## 2025-07-30 DIAGNOSIS — H43.813: ICD-10-CM

## 2025-07-30 DIAGNOSIS — H01.004: ICD-10-CM

## 2025-07-30 DIAGNOSIS — H01.001: ICD-10-CM

## 2025-07-30 PROBLEM — H52.7 REFRACTIVE ERROR ; BOTH EYES: Status: ACTIVE | Noted: 2025-07-01

## 2025-07-30 PROCEDURE — 99213 OFFICE O/P EST LOW 20 MIN: CPT | Performed by: OPHTHALMOLOGY

## 2025-07-30 ASSESSMENT — LID EXAM ASSESSMENTS
OS_BLEPHARITIS: LUL T
OD_BLEPHARITIS: RUL T

## 2025-07-30 ASSESSMENT — REFRACTION_AUTOREFRACTION
OS_AXIS: 093
OD_CYLINDER: -1.00
OS_SPHERE: +2.50
OD_SPHERE: +2.00
OS_CYLINDER: -1.00
OD_AXIS: 084

## 2025-07-30 ASSESSMENT — REFRACTION_CURRENTRX
OD_SPHERE: +1.75
OD_VPRISM_DIRECTION: PROGS
OS_AXIS: 087
OD_OVR_VA: 20/
OS_CYLINDER: -0.50
OS_ADD: +1.75
OS_VPRISM_DIRECTION: PROGS
OD_CYLINDER: SPHERE
OS_OVR_VA: 20/
OD_ADD: +1.75
OS_SPHERE: +2.00

## 2025-07-30 ASSESSMENT — KERATOMETRY
OD_K1POWER_DIOPTERS: 41.75
OS_K2POWER_DIOPTERS: 42.25
OS_AXISANGLE_DEGREES: 176
OD_AXISANGLE_DEGREES: 027
OD_K2POWER_DIOPTERS: 42.00
OS_K1POWER_DIOPTERS: 42.00

## 2025-07-30 ASSESSMENT — CONFRONTATIONAL VISUAL FIELD TEST (CVF)
OS_FINDINGS: FULL
OD_FINDINGS: FULL

## 2025-07-30 ASSESSMENT — TONOMETRY
OD_IOP_MMHG: 15
OS_IOP_MMHG: 16

## 2025-07-30 ASSESSMENT — VISUAL ACUITY
OD_BCVA: 20/20
OS_BCVA: 20/30

## 2025-08-10 ENCOUNTER — NON-APPOINTMENT (OUTPATIENT)
Age: 55
End: 2025-08-10